# Patient Record
Sex: MALE | Race: WHITE
[De-identification: names, ages, dates, MRNs, and addresses within clinical notes are randomized per-mention and may not be internally consistent; named-entity substitution may affect disease eponyms.]

---

## 2019-05-07 ENCOUNTER — HOSPITAL ENCOUNTER (INPATIENT)
Dept: HOSPITAL 19 - INPTSU | Age: 69
LOS: 30 days | Discharge: HOME | DRG: 708 | End: 2019-06-06
Attending: UROLOGY | Admitting: UROLOGY
Payer: MEDICARE

## 2019-05-07 VITALS — BODY MASS INDEX: 31.74 KG/M2 | WEIGHT: 209.44 LBS | HEIGHT: 68 IN

## 2019-05-07 DIAGNOSIS — Z79.82: ICD-10-CM

## 2019-05-07 DIAGNOSIS — M13.89: ICD-10-CM

## 2019-05-07 DIAGNOSIS — C61: Primary | ICD-10-CM

## 2019-05-07 DIAGNOSIS — G40.909: ICD-10-CM

## 2019-05-07 PROCEDURE — A9284 NON-ELECTRONIC SPIROMETER: HCPCS

## 2019-05-07 PROCEDURE — A4314 CATH W/DRAINAGE 2-WAY LATEX: HCPCS

## 2019-06-04 VITALS — DIASTOLIC BLOOD PRESSURE: 70 MMHG | HEART RATE: 74 BPM | SYSTOLIC BLOOD PRESSURE: 109 MMHG

## 2019-06-04 VITALS — TEMPERATURE: 97.7 F | HEART RATE: 67 BPM | SYSTOLIC BLOOD PRESSURE: 100 MMHG | DIASTOLIC BLOOD PRESSURE: 59 MMHG

## 2019-06-04 VITALS — DIASTOLIC BLOOD PRESSURE: 64 MMHG | HEART RATE: 40 BPM | SYSTOLIC BLOOD PRESSURE: 126 MMHG | TEMPERATURE: 97.4 F

## 2019-06-04 VITALS — TEMPERATURE: 97.4 F | DIASTOLIC BLOOD PRESSURE: 63 MMHG | HEART RATE: 69 BPM | SYSTOLIC BLOOD PRESSURE: 104 MMHG

## 2019-06-04 VITALS — HEART RATE: 70 BPM | TEMPERATURE: 97.4 F | DIASTOLIC BLOOD PRESSURE: 64 MMHG | SYSTOLIC BLOOD PRESSURE: 105 MMHG

## 2019-06-04 VITALS — HEART RATE: 81 BPM | SYSTOLIC BLOOD PRESSURE: 104 MMHG | DIASTOLIC BLOOD PRESSURE: 65 MMHG | TEMPERATURE: 97.7 F

## 2019-06-04 VITALS — SYSTOLIC BLOOD PRESSURE: 100 MMHG | DIASTOLIC BLOOD PRESSURE: 56 MMHG | HEART RATE: 86 BPM

## 2019-06-04 VITALS — DIASTOLIC BLOOD PRESSURE: 59 MMHG | SYSTOLIC BLOOD PRESSURE: 97 MMHG | HEART RATE: 69 BPM

## 2019-06-04 PROCEDURE — 07TC4ZZ RESECTION OF PELVIS LYMPHATIC, PERCUTANEOUS ENDOSCOPIC APPROACH: ICD-10-PCS | Performed by: UROLOGY

## 2019-06-04 PROCEDURE — 8E0W4CZ ROBOTIC ASSISTED PROCEDURE OF TRUNK REGION, PERCUTANEOUS ENDOSCOPIC APPROACH: ICD-10-PCS | Performed by: UROLOGY

## 2019-06-04 PROCEDURE — 0VT04ZZ RESECTION OF PROSTATE, PERCUTANEOUS ENDOSCOPIC APPROACH: ICD-10-PCS | Performed by: UROLOGY

## 2019-06-04 NOTE — NUR
PT TO ROOM 344 PER BED WITH PAIGE RN PACU @ 1700. VSS, 5 INCISIONS CLOSED WITH
GILES SET. GOOD DRAIN OFF TO LEFT SIDE OF ABDOMEN. ALVAREZ WITH REDDISH URINE IN
BAG. PATIENT DROWSEY BUT AROUSES TO VERBAL STIMULI.

## 2019-06-04 NOTE — NUR
Shift assessment complete. Patient c/o 6/10 pain at indwelling catheter site.
Urine draining well, to gravity. Prn pain medication given. 95% on 2L oxygen,
titrated down to 1L oxygen. Will continue to assess. 5 lap sites CDI, one site
has GOOD drain to bulb suction, draining sanguineous fluid. Patient will go for
a walk shortly. IVF still infusing as he is not drinking a lot of fluid. Will
continue to monitor. Denies further needs at this time.

## 2019-06-05 VITALS — HEART RATE: 74 BPM | SYSTOLIC BLOOD PRESSURE: 116 MMHG | DIASTOLIC BLOOD PRESSURE: 63 MMHG | TEMPERATURE: 97.8 F

## 2019-06-05 VITALS — SYSTOLIC BLOOD PRESSURE: 111 MMHG | DIASTOLIC BLOOD PRESSURE: 61 MMHG | TEMPERATURE: 98.1 F | HEART RATE: 62 BPM

## 2019-06-05 VITALS — SYSTOLIC BLOOD PRESSURE: 104 MMHG | DIASTOLIC BLOOD PRESSURE: 63 MMHG | TEMPERATURE: 97.7 F | HEART RATE: 58 BPM

## 2019-06-05 VITALS — DIASTOLIC BLOOD PRESSURE: 61 MMHG | SYSTOLIC BLOOD PRESSURE: 116 MMHG | HEART RATE: 68 BPM | TEMPERATURE: 97.8 F

## 2019-06-05 VITALS — DIASTOLIC BLOOD PRESSURE: 54 MMHG | SYSTOLIC BLOOD PRESSURE: 150 MMHG | HEART RATE: 49 BPM | TEMPERATURE: 97.7 F

## 2019-06-05 VITALS — DIASTOLIC BLOOD PRESSURE: 56 MMHG | SYSTOLIC BLOOD PRESSURE: 120 MMHG | TEMPERATURE: 97.9 F | HEART RATE: 54 BPM

## 2019-06-05 LAB
ANION GAP SERPL CALC-SCNC: 10 MMOL/L (ref 7–16)
BASOPHILS # BLD: 0 10*3/UL (ref 0–0.2)
BASOPHILS NFR BLD AUTO: 0.1 % (ref 0–2)
BUN SERPL-MCNC: 19 MG/DL (ref 9–20)
CALCIUM SERPL-MCNC: 8.8 MG/DL (ref 8.4–10.2)
CHLORIDE SERPL-SCNC: 103 MMOL/L (ref 98–107)
CO2 SERPL-SCNC: 24 MMOL/L (ref 22–30)
CREAT SERPL-SCNC: 1 UMOL/L (ref 0.66–1.25)
EOSINOPHIL # BLD: 0 10*3/UL (ref 0–0.7)
EOSINOPHIL NFR BLD: 0 % (ref 0–4)
ERYTHROCYTE [DISTWIDTH] IN BLOOD BY AUTOMATED COUNT: 11.9 % (ref 11.5–14.5)
GLUCOSE SERPL-MCNC: 146 MG/DL (ref 74–106)
GRANULOCYTES # BLD AUTO: 86.1 % (ref 42.2–75.2)
HCT VFR BLD AUTO: 43.5 % (ref 42–52)
HGB BLD-MCNC: 14.8 G/DL (ref 13.5–18)
LYMPHOCYTES # BLD: 1.6 10*3/UL (ref 1.2–3.4)
LYMPHOCYTES NFR BLD: 8.6 % (ref 20–51)
MCH RBC QN AUTO: 30 PG (ref 27–31)
MCHC RBC AUTO-ENTMCNC: 34 G/DL (ref 33–37)
MCV RBC AUTO: 89 FL (ref 80–100)
MONOCYTES # BLD: 0.9 10*3/UL (ref 0.1–0.6)
MONOCYTES NFR BLD AUTO: 4.7 % (ref 1.7–9.3)
NEUTROPHILS # BLD: 15.6 10*3/UL (ref 1.4–6.5)
PLATELET # BLD AUTO: 231 K/MM3 (ref 130–400)
PMV BLD AUTO: 10 FL (ref 7.4–10.4)
POTASSIUM SERPL-SCNC: 4.5 MMOL/L (ref 3.4–5)
RBC # BLD AUTO: 4.89 M/MM3 (ref 4.2–5.6)
SODIUM SERPL-SCNC: 137 MMOL/L (ref 137–145)

## 2019-06-05 NOTE — NUR
met with patient to discuss discharge planning.  Patient states
he lives with his spouse in [facility] and he plans to return home upon
discharge.  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
activities of daily living.  Patient's primary care provider is Dr Coto in
[facility].  Patient denies difficulty obtaining prescriptions.

## 2019-06-05 NOTE — NUR
Pt AAOx4 in bed watching television asking for advancement in diet. Alvarez
assessed and alvarez care provided and educated on importance. Call light in
reach. No concerns at this time.

## 2019-06-05 NOTE — NUR
PT RESTING IN BED. A+OX4. REPORTS NO PAIN. NO NAUSEA. ALVAREZ DRAINING FREELY,
NO KINKS, SECURED TO LEG.- ABER URINE WITH BLOOD CLOTS NOTED. GOOD DRAIN
COMPRESSED. NO GOOD OUTPUT AT OF NOW. IV FLUSHES WELL NO REDNESS, NO SWELLING.
NO NEEDS AT THIS TIME. CALL LIGHT IN REACH

## 2019-06-05 NOTE — NUR
Initial visit; Patient states he is doing well and has support from his wife.
He thanked  for offering spiritual care and wishing him well.

## 2019-06-05 NOTE — NUR
Patient in bed, resting. Denies pain. IVF d/c'ed per order. Denies further
needs, will continue to monitor.

## 2019-06-06 VITALS — HEART RATE: 73 BPM | TEMPERATURE: 97.7 F | SYSTOLIC BLOOD PRESSURE: 114 MMHG | DIASTOLIC BLOOD PRESSURE: 57 MMHG

## 2019-06-06 VITALS — SYSTOLIC BLOOD PRESSURE: 125 MMHG | HEART RATE: 72 BPM | DIASTOLIC BLOOD PRESSURE: 57 MMHG | TEMPERATURE: 97.7 F

## 2019-06-06 VITALS — SYSTOLIC BLOOD PRESSURE: 134 MMHG | HEART RATE: 76 BPM | TEMPERATURE: 97.6 F | DIASTOLIC BLOOD PRESSURE: 58 MMHG

## 2019-06-06 NOTE — NUR
Patient will be discharging this afternoon. He has been up walking in the
hallways. Minimal complaint of pain. He is starting to pass flatus without
problems. Denies nausea. Encouraged him to walk 4 times a day while here and
when he gets home. Patient stated his ride will be here after lunch. No other
changes at this time. Call light within reach.

## 2019-06-06 NOTE — NUR
Patient is discharging home. Discharge instructions discussed with patient and
wife. Questions asked and answered. Explained he also has education in his
discharge packet that he can look at if he forget something. Explained how to
use the leg bag and care for it. Explained how to clean catheter twice a day.
Explained how to empty both bags. Patient and wife verbalized understanding.
Copies of discharge instructions sent with patient. Explained that office will
call with a different follow up appointment date and time. All belongings
packed up and sent with patient. INT discontinued. Patient walked out via
wheel chair by Heather SMITH.